# Patient Record
Sex: MALE | Race: WHITE | NOT HISPANIC OR LATINO | Employment: FULL TIME | URBAN - NONMETROPOLITAN AREA
[De-identification: names, ages, dates, MRNs, and addresses within clinical notes are randomized per-mention and may not be internally consistent; named-entity substitution may affect disease eponyms.]

---

## 2017-10-02 ENCOUNTER — OFFICE VISIT (OUTPATIENT)
Dept: FAMILY MEDICINE CLINIC | Facility: CLINIC | Age: 22
End: 2017-10-02

## 2017-10-02 VITALS
HEIGHT: 74 IN | BODY MASS INDEX: 26.56 KG/M2 | TEMPERATURE: 98.4 F | DIASTOLIC BLOOD PRESSURE: 80 MMHG | SYSTOLIC BLOOD PRESSURE: 132 MMHG | HEART RATE: 68 BPM | WEIGHT: 207 LBS

## 2017-10-02 DIAGNOSIS — L02.416 CUTANEOUS ABSCESS OF LEFT LOWER EXTREMITY: Primary | ICD-10-CM

## 2017-10-02 DIAGNOSIS — L03.116 CELLULITIS OF LEFT LOWER EXTREMITY: ICD-10-CM

## 2017-10-02 PROCEDURE — 87205 SMEAR GRAM STAIN: CPT | Performed by: INTERNAL MEDICINE

## 2017-10-02 PROCEDURE — 87186 SC STD MICRODIL/AGAR DIL: CPT | Performed by: INTERNAL MEDICINE

## 2017-10-02 PROCEDURE — 87147 CULTURE TYPE IMMUNOLOGIC: CPT | Performed by: INTERNAL MEDICINE

## 2017-10-02 PROCEDURE — 87077 CULTURE AEROBIC IDENTIFY: CPT | Performed by: INTERNAL MEDICINE

## 2017-10-02 PROCEDURE — 10060 I&D ABSCESS SIMPLE/SINGLE: CPT | Performed by: INTERNAL MEDICINE

## 2017-10-02 PROCEDURE — 87070 CULTURE OTHR SPECIMN AEROBIC: CPT | Performed by: INTERNAL MEDICINE

## 2017-10-02 RX ORDER — SULFAMETHOXAZOLE AND TRIMETHOPRIM 800; 160 MG/1; MG/1
1 TABLET ORAL 2 TIMES DAILY
Qty: 20 TABLET | Refills: 0 | Status: SHIPPED | OUTPATIENT
Start: 2017-10-02 | End: 2017-10-12

## 2017-10-02 NOTE — PROGRESS NOTES
Subjective        History of Present Illness     Richardson Head is a 22 y.o. male who graduated from [employer] and is employed at [employer] in [place].  He established care in my practice last fall.  He presents today reporting a painful skin/soft tissue infection located on the left knee with surrounding cellulitis, which started with what appeared to be an insect bite.  He started noticed the area four days ago on Friday, which gradually worsened.  Saturday evening, he expressed a minimal amount of drainage, which gave minimal relief of pain.  He lives alone and denies any personal or family history of MRSA.  On exam, the patient has a very early abscess approximately 3 cm distal to the left patella.  There is surrounding cellulitis.    After informed verbal consent, the area surrounding the abscess is cleaned in sterile technique.  Local anesthesia is obtained using 1% lidocaine with epinephrine.  An incision is made with a #11 blade.   Culture of a minimal amount of purulent drainage is obtained.  The area is dressed with a bandage and coban wrap.  Wound care instructions given.  I marked the area of cellulitis.  Prescriptions for Bactrim DS and Bactroban are given.  He is to notify me or seek emergency attention if the area of cellulitis increases or does not resolve.         Review of Systems   Constitutional: Negative for chills, fatigue and fever.   HENT: Negative for congestion, ear pain, postnasal drip, sinus pressure and sore throat.    Respiratory: Negative for cough, shortness of breath and wheezing.    Cardiovascular: Negative for chest pain, palpitations and leg swelling.   Gastrointestinal: Negative for abdominal pain, blood in stool, constipation, diarrhea, nausea and vomiting.   Endocrine: Negative for cold intolerance, heat intolerance, polydipsia and polyuria.   Genitourinary: Negative for dysuria, frequency, hematuria and urgency.   Musculoskeletal:        Left  "knee pain.    Skin: Positive for rash.   Neurological: Negative for syncope and weakness.        Objective     Vitals:    10/02/17 1331   BP: 132/80   Pulse: 68   Temp: 98.4 °F (36.9 °C)   TempSrc: Oral   Weight: 207 lb (93.9 kg)   Height: 74\" (188 cm)     Physical Exam   Constitutional: He is oriented to person, place, and time. He appears well-developed and well-nourished. No distress.   HENT:   Head: Normocephalic and atraumatic.   Eyes: EOM are normal. Pupils are equal, round, and reactive to light.   Neck: Neck supple. No JVD present. No thyromegaly present.   Cardiovascular: Normal rate, regular rhythm and normal heart sounds.    Pulmonary/Chest: Effort normal and breath sounds normal. No accessory muscle usage. No respiratory distress. He has no wheezes. He has no rales.   Abdominal: Soft. Bowel sounds are normal. He exhibits no distension. There is no tenderness.   Musculoskeletal: He exhibits no edema.   Lymphadenopathy:     He has no cervical adenopathy.   Neurological: He is alert and oriented to person, place, and time. No cranial nerve deficit.   Skin: Rash noted.   Exam of the left lower extremity reveals an early/mild abscess approximately 3 cm distal to the left patella near the tibial plateau.  There is minimal if any fluctuance.  I am able to obtain a small amount of purulent drainage which is collected and sent for culture.  There is surrounding warmth and erythema of the skin consistent with cellulitis.  The proximal aspect of the cellulitis is at the level of the patella and it extends distally to the mid shin region.  No red streaking up the left thigh.   Psychiatric: He has a normal mood and affect. His speech is normal and behavior is normal. Thought content normal.           Assessment/Plan    Incision & Drainage  Date/Time: 10/2/2017 1:55 PM  Performed by: LUIGI RAMSEY  Authorized by: LUIGI RAMSEY   Type: abscess  Body area: lower extremity  Location details: left knee  Anesthesia: local " infiltration    Anesthesia:  Local Anesthetic: lidocaine 1% with epinephrine    Sedation:  Patient sedated: no  Scalpel size: 11  Needle gauge: 22  Incision type: single straight  Drainage: purulent  Drainage amount: scant  Wound treatment: wound left open  Patient tolerance: Patient tolerated the procedure well with no immediate complications        After informed verbal consent, the left knee is cleaned in sterile technique.  Local anesthesia is obtained using 1% lidocaine with epinephrine.  An incision is made with a #11 blade.   Culture is obtained.  A coban wrap is applied.  I marked the area of cellulitis  He is to monitor the area.  If he notices an increasing area of cellulitis, he is notify me or present to the emergency room.  He can apply moist warm compress to the knee for symptomatic relief.       Bactrim DS one tablet b.i.d. X 10 days and Bactroban 2% ointment to apply b.i.d.    Scribed for Dr. Whipple by [facility].     Diagnoses and all orders for this visit:    Cutaneous abscess of left lower extremity  -     Wound Culture - Wound, Leg, Left    Cellulitis of left lower extremity  -     Wound Culture - Wound, Leg, Left    Other orders  -     mupirocin (BACTROBAN) 2 % ointment; Apply  topically 2 (Two) Times a Day.  -     sulfamethoxazole-trimethoprim (BACTRIM DS,SEPTRA DS) 800-160 MG per tablet; Take 1 tablet by mouth 2 (Two) Times a Day for 20 doses.  -     Incision & Drainage      No results found for any previous visit.]

## 2017-10-05 LAB
BACTERIA SPEC AEROBE CULT: ABNORMAL
BACTERIA SPEC AEROBE CULT: ABNORMAL
GRAM STN SPEC: ABNORMAL
GRAM STN SPEC: ABNORMAL